# Patient Record
Sex: MALE | Race: WHITE | NOT HISPANIC OR LATINO | URBAN - METROPOLITAN AREA
[De-identification: names, ages, dates, MRNs, and addresses within clinical notes are randomized per-mention and may not be internally consistent; named-entity substitution may affect disease eponyms.]

---

## 2017-06-14 ENCOUNTER — OUTPATIENT (OUTPATIENT)
Dept: OUTPATIENT SERVICES | Facility: HOSPITAL | Age: 22
LOS: 1 days | Discharge: HOME | End: 2017-06-14

## 2017-06-19 DIAGNOSIS — K21.9 GASTRO-ESOPHAGEAL REFLUX DISEASE WITHOUT ESOPHAGITIS: ICD-10-CM

## 2017-06-19 DIAGNOSIS — J34.2 DEVIATED NASAL SEPTUM: ICD-10-CM

## 2017-06-19 DIAGNOSIS — J34.3 HYPERTROPHY OF NASAL TURBINATES: ICD-10-CM

## 2017-06-19 LAB — SURGICAL PATHOLOGY STUDY: SIGNIFICANT CHANGE UP

## 2022-03-03 NOTE — ASU PATIENT PROFILE, ADULT - BRAND OF COVID-19 VACCINATION
Mr. Doug Ellison is a 74 y.o. y/o male with history of DVT who presents today for anticoagulation monitoring and adjustment. Pertinent PMH: Has been on warfarin since 2015. Hx patient of [facility] clinic  Patient Reported Findings:  Yes     No  [x]   []       Patient verifies current dosing regimen as listed- warfarin 5 mg Sun & Tues and 10 mg all other days---> confirms dose   []   [x]       S/S bleeding/bruising/swelling/SOB- denies   []   [x]       Blood in urine or stool-denies   []   [x]       Procedures scheduled in the future at this time- April 13- colonoscopy, will need to call about holding/lovenox --> cancelled d/t COVID19   []   [x]       Missed Dose- denies   []   [x]       Extra Dose- denies   []   [x]       Change in medications- denies    []   [x]       Change in health/diet/appetite- has greens twice weekly- broccoli and spinach and peas and sometimes iceburg lettuce, really likes dark green leafy vegetables- explained need for consistency---> had more greens than normal---> continue with greens        []   [x]       Change in alcohol use- has a glass of wine occasionally --> has a beer once a month   []   [x]       Change in activity  []   [x]       Hospital admission  []   [x]       Emergency department visit  []   [x]       Other complaints    Clinical Outcomes:  Yes     No  []   [x]       Major bleeding event  []   [x]       Thromboembolic event    Duration of warfarin Therapy: indefinite   INR Range:  2.0-3.0     Has 10mg tablets of warfarin and uses a pillbox and takes in the morning. INR today is 2.5  Continue weekly dose of 5 mg Sun & Tues and 10 mg all other days. Encouraged to maintain a consistency of vegetables/salads.   Recheck INR in 4 weeks, 8/31    Referring is Dr. Cortez Raymond   INR (no units)   Date Value   08/03/2020 2.5   07/06/2020 2.2   05/29/2020 2.90   04/28/2020 2.30   03/31/2020 2.20   03/12/2020 1.7     CLINICAL PHARMACY CONSULT: MED RECONCILIATION/REVIEW [address] Only    PHSO: No  Total # of Interventions Recommended: 0  - Maintenance Safety Lab Monitoring #: 1  Total Interventions Accepted: 0  Time Spent (min): Via Sparkle Osuna, PharmD Pfizer dose 1 and 2

## 2022-03-04 ENCOUNTER — OUTPATIENT (OUTPATIENT)
Dept: OUTPATIENT SERVICES | Facility: HOSPITAL | Age: 27
LOS: 1 days | Discharge: HOME | End: 2022-03-04
Payer: COMMERCIAL

## 2022-03-04 VITALS
HEART RATE: 65 BPM | RESPIRATION RATE: 16 BRPM | TEMPERATURE: 96 F | OXYGEN SATURATION: 98 % | DIASTOLIC BLOOD PRESSURE: 75 MMHG | SYSTOLIC BLOOD PRESSURE: 128 MMHG

## 2022-03-04 VITALS
DIASTOLIC BLOOD PRESSURE: 85 MMHG | HEIGHT: 74 IN | RESPIRATION RATE: 16 BRPM | SYSTOLIC BLOOD PRESSURE: 123 MMHG | WEIGHT: 149.69 LBS | TEMPERATURE: 98 F | OXYGEN SATURATION: 100 % | HEART RATE: 88 BPM

## 2022-03-04 DIAGNOSIS — Z98.890 OTHER SPECIFIED POSTPROCEDURAL STATES: Chronic | ICD-10-CM

## 2022-03-04 PROCEDURE — 88305 TISSUE EXAM BY PATHOLOGIST: CPT | Mod: 26

## 2022-03-04 RX ORDER — SODIUM CHLORIDE 9 MG/ML
1000 INJECTION, SOLUTION INTRAVENOUS
Refills: 0 | Status: DISCONTINUED | OUTPATIENT
Start: 2022-03-04 | End: 2022-03-04

## 2022-03-04 RX ORDER — OXYCODONE HYDROCHLORIDE 5 MG/1
5 TABLET ORAL ONCE
Refills: 0 | Status: DISCONTINUED | OUTPATIENT
Start: 2022-03-04 | End: 2022-03-04

## 2022-03-04 RX ORDER — ONDANSETRON 8 MG/1
4 TABLET, FILM COATED ORAL ONCE
Refills: 0 | Status: DISCONTINUED | OUTPATIENT
Start: 2022-03-04 | End: 2022-03-04

## 2022-03-04 RX ADMIN — SODIUM CHLORIDE 75 MILLILITER(S): 9 INJECTION, SOLUTION INTRAVENOUS at 11:38

## 2022-03-04 NOTE — BRIEF OPERATIVE NOTE - OPERATION/FINDINGS
Patient prepped and draped in a sterile fashion. Local anesthesia was administrated. anal condylomas visualized circumferentially 4 cm proximal to the anal verge. anal condyloma specimen excised. Fulguration of circumferential anal condylomas performed using Bovie electrocautery. Area examined to ensure all condylomas were fulgurated. hemostasis ensured, dressing placed.

## 2022-03-04 NOTE — ASU DISCHARGE PLAN (ADULT/PEDIATRIC) - NS MD DC FALL RISK RISK
For information on Fall & Injury Prevention, visit: [email or web address] OR  [email or web address] OR  https://www.cdc.gov/steadi/patient.html

## 2022-03-04 NOTE — ASU DISCHARGE PLAN (ADULT/PEDIATRIC) - PROCEDURE
Prescription picked up by pt's nancie Simmons ID Verified  
Unable to leave message, due to mailbox not set up, that the written RX is ready at the [facility]  registration to be picked up.     
adderall      Last Written Prescription Date: 9/15/17  Last Fill Quantity: 60,  # refills: 0   Last Office Visit with [facility], [facility] or [facility] prescribing provider: 9/6/17                                         Next 5 appointments (look out 90 days)     Oct 19, 2017  9:20 AM CDT   (Arrive by 9:00 AM)   SHORT with Tommy Lamb DO   [facility] [place] ([facility] )    [address] [place] [address]   [phone]                      
Excision and fulguration of anal condyloma

## 2022-03-04 NOTE — BRIEF OPERATIVE NOTE - NSICDXBRIEFPROCEDURE_GEN_ALL_CORE_FT
PROCEDURES:  Fulguration, anal, condyloma 04-Mar-2022 11:32:14 dagoberto specimen taken Luz Santillan

## 2022-03-04 NOTE — ASU DISCHARGE PLAN (ADULT/PEDIATRIC) - ASU DC SPECIAL INSTRUCTIONSFT
FOLLOW UP: Dr. Melgoza in 1 month. Call the office  with any questions and to make an appointment.    WOUND CARE: Dressing can come down tonight or when you need to have a bowel movement. Please perform sitz baths 3 times a day and after bowel movements.     ACTIVITY:Ambulate as tolerated    DIET:   You may resume regular diet.    Call the office if you experience increasing pain, redness, swelling or drainage from incision sites/wounds, or temperature >101.5 F.    NEW MEDICATIONS: Medications have been sent by Dr. Melgoza's office. Please take medications as prescribed and take stool softeners (Colace) to keep stool soft.     DISCHARGE DESTINATION: Home

## 2022-03-04 NOTE — ASU DISCHARGE PLAN (ADULT/PEDIATRIC) - CARE PROVIDER_API CALL
Arpan Melgoza)  ColonRectal Surgery; Surgery  [address] [place] [address], [address]  [place] [address]  Phone: [phone]  Fax: [phone]  Follow Up Time:

## 2022-03-10 LAB — SURGICAL PATHOLOGY STUDY: SIGNIFICANT CHANGE UP

## 2023-03-27 ENCOUNTER — EMERGENCY (EMERGENCY)
Facility: HOSPITAL | Age: 28
LOS: 1 days | Discharge: HOME | End: 2023-03-27
Admitting: EMERGENCY MEDICINE
Payer: COMMERCIAL

## 2023-03-27 VITALS
RESPIRATION RATE: 16 BRPM | SYSTOLIC BLOOD PRESSURE: 115 MMHG | OXYGEN SATURATION: 97 % | DIASTOLIC BLOOD PRESSURE: 79 MMHG | HEART RATE: 71 BPM | TEMPERATURE: 98 F

## 2023-03-27 DIAGNOSIS — M25.562 PAIN IN LEFT KNEE: ICD-10-CM

## 2023-03-27 DIAGNOSIS — Z98.890 OTHER SPECIFIED POSTPROCEDURAL STATES: Chronic | ICD-10-CM

## 2023-03-27 DIAGNOSIS — M79.662 PAIN IN LEFT LOWER LEG: ICD-10-CM

## 2023-03-27 PROCEDURE — 93970 EXTREMITY STUDY: CPT | Mod: 26

## 2023-03-27 PROCEDURE — 99284 EMERGENCY DEPT VISIT MOD MDM: CPT

## 2023-03-28 PROBLEM — N41.9 INFLAMMATORY DISEASE OF PROSTATE, UNSPECIFIED: Chronic | Status: ACTIVE | Noted: 2022-03-04

## (undated) DEVICE — DRAPE TOWEL BLUE 17" X 24"

## (undated) DEVICE — DRAPE 1/2 SHEET 40X57"

## (undated) DEVICE — ELCTR BOVIE TIP BLADE INSULATED 2.75" EDGE

## (undated) DEVICE — PACK COLO RECTAL

## (undated) DEVICE — WARMING BLANKET UPPER ADULT

## (undated) DEVICE — SLV COMPRESSION KNEE MED

## (undated) DEVICE — GLV 7 PROTEXIS (WHITE)

## (undated) DEVICE — DRSG TELFA 3 X 8